# Patient Record
Sex: FEMALE | Employment: FULL TIME | URBAN - METROPOLITAN AREA
[De-identification: names, ages, dates, MRNs, and addresses within clinical notes are randomized per-mention and may not be internally consistent; named-entity substitution may affect disease eponyms.]

---

## 2017-09-01 ENCOUNTER — OFFICE VISIT (OUTPATIENT)
Dept: OBGYN CLINIC | Facility: CLINIC | Age: 47
End: 2017-09-01

## 2017-09-01 VITALS
DIASTOLIC BLOOD PRESSURE: 66 MMHG | WEIGHT: 124 LBS | HEIGHT: 61 IN | SYSTOLIC BLOOD PRESSURE: 104 MMHG | BODY MASS INDEX: 23.41 KG/M2

## 2017-09-01 DIAGNOSIS — Z01.419 WOMEN'S ANNUAL ROUTINE GYNECOLOGICAL EXAMINATION: Primary | ICD-10-CM

## 2017-09-01 PROBLEM — B00.9 HERPES: Status: ACTIVE | Noted: 2017-09-01

## 2017-09-01 PROCEDURE — 99396 PREV VISIT EST AGE 40-64: CPT | Performed by: OBSTETRICS & GYNECOLOGY

## 2017-09-01 PROCEDURE — 88175 CYTOPATH C/V AUTO FLUID REDO: CPT | Performed by: OBSTETRICS & GYNECOLOGY

## 2017-09-01 PROCEDURE — 87624 HPV HI-RISK TYP POOLED RSLT: CPT | Performed by: OBSTETRICS & GYNECOLOGY

## 2017-09-01 RX ORDER — VITAMIN B COMPLEX
1 TABLET ORAL
COMMUNITY
End: 2020-12-16

## 2017-09-01 NOTE — PROGRESS NOTES
Judith Trevino here for gynecologic checkup. She has no gynecologic complaints. Patient's periods are about 26-28 days apart lasting for 7 days. She says that about a day or 2 of her periods sometimes are very heavy.   She denies shortness of breath, fainting s Topics   Smoking status: Never Smoker    Smokeless tobacco: Never Used    Alcohol use Yes  0.0 oz/week     Drug use: No    Sexual activity: Yes    Partners: Male     Other Topics Concern   None on file     Social History Narrative   None on file       Exam

## 2017-09-04 LAB — HPV I/H RISK 1 DNA SPEC QL NAA+PROBE: NEGATIVE

## 2018-08-30 ENCOUNTER — OFFICE VISIT (OUTPATIENT)
Dept: OBGYN CLINIC | Facility: CLINIC | Age: 48
End: 2018-08-30
Payer: COMMERCIAL

## 2018-08-30 VITALS
HEIGHT: 61.5 IN | DIASTOLIC BLOOD PRESSURE: 74 MMHG | BODY MASS INDEX: 24.23 KG/M2 | WEIGHT: 130 LBS | SYSTOLIC BLOOD PRESSURE: 116 MMHG

## 2018-08-30 DIAGNOSIS — Z01.419 WOMEN'S ANNUAL ROUTINE GYNECOLOGICAL EXAMINATION: Primary | ICD-10-CM

## 2018-08-30 PROBLEM — D24.9 FIBROADENOMA OF BREAST: Status: ACTIVE | Noted: 2018-08-30

## 2018-08-30 PROCEDURE — 99396 PREV VISIT EST AGE 40-64: CPT | Performed by: OBSTETRICS & GYNECOLOGY

## 2018-08-30 PROCEDURE — 87624 HPV HI-RISK TYP POOLED RSLT: CPT | Performed by: OBSTETRICS & GYNECOLOGY

## 2018-08-30 RX ORDER — CHLORAL HYDRATE 500 MG
CAPSULE ORAL
COMMUNITY

## 2018-08-30 NOTE — PROGRESS NOTES
Hoda Weinstein is here for a checkup. She says that she has appear to be 26-28 days and about a day before her period starts she gets pelvic congestion type of symptoms at night and when she walks around for a while the symptoms go away.   She says it is not very Take by mouth.  Disp:  Rfl:        Family History     Family History   Problem Relation Age of Onset   • Kidney Disease Father    • Hypertension Father        Social History       Social History  Social History   Marital status: Single  Spouse name: N/A [facility] cervical os.       Nuno Rodriguez MD  8:23 AM  8/30/2018

## 2018-08-31 LAB — HPV I/H RISK 1 DNA SPEC QL NAA+PROBE: NEGATIVE

## 2019-03-29 ENCOUNTER — OFFICE VISIT (OUTPATIENT)
Dept: OBGYN CLINIC | Facility: CLINIC | Age: 49
End: 2019-03-29
Payer: COMMERCIAL

## 2019-03-29 VITALS — SYSTOLIC BLOOD PRESSURE: 102 MMHG | BODY MASS INDEX: 24 KG/M2 | HEIGHT: 61.5 IN | DIASTOLIC BLOOD PRESSURE: 60 MMHG

## 2019-03-29 DIAGNOSIS — R87.610 ATYPICAL SQUAMOUS CELL CHANGES OF UNDETERMINED SIGNIFICANCE (ASCUS) ON CERVICAL CYTOLOGY WITH NEGATIVE HIGH RISK HUMAN PAPILLOMA VIRUS (HPV) TEST RESULT: Primary | ICD-10-CM

## 2019-03-29 PROCEDURE — 99213 OFFICE O/P EST LOW 20 MIN: CPT | Performed by: OBSTETRICS & GYNECOLOGY

## 2019-03-29 PROCEDURE — 87624 HPV HI-RISK TYP POOLED RSLT: CPT | Performed by: OBSTETRICS & GYNECOLOGY

## 2019-03-29 NOTE — PROGRESS NOTES
Eli Kirk is here for repeat Pap smear. Her previous Pap smear had shown ASCUS with negative HPV. On examination today: Cervix appears normal obtained a Pap smear on her patient is towards the tail end of her period.   She is 48 and it is possible Pap sme

## 2019-03-31 LAB — HPV I/H RISK 1 DNA SPEC QL NAA+PROBE: NEGATIVE

## 2019-06-05 ENCOUNTER — TELEPHONE (OUTPATIENT)
Dept: OBGYN CLINIC | Facility: CLINIC | Age: 49
End: 2019-06-05

## 2019-06-05 NOTE — TELEPHONE ENCOUNTER
Pt states she's have vaginal irritation on the outside with no discharge. Pt wants to come in asap I explained to pt no openings. Pt wants a call back.

## 2019-06-05 NOTE — TELEPHONE ENCOUNTER
Spoke with pt who has had vag irritation intermittently since 5/30/19. Pt denies: fever, chills, vag discharge. Pt unsure of cause. Advised pt to go to urgent care r/t symptoms since no openings this week.  [facility]; pt will ca

## 2019-06-11 ENCOUNTER — OFFICE VISIT (OUTPATIENT)
Dept: OBGYN CLINIC | Facility: CLINIC | Age: 49
End: 2019-06-11
Payer: COMMERCIAL

## 2019-06-11 VITALS — HEIGHT: 61.5 IN | SYSTOLIC BLOOD PRESSURE: 122 MMHG | DIASTOLIC BLOOD PRESSURE: 76 MMHG | BODY MASS INDEX: 24 KG/M2

## 2019-06-11 DIAGNOSIS — N76.0 VAGINITIS AND VULVOVAGINITIS: Primary | ICD-10-CM

## 2019-06-11 PROBLEM — N76.2 VULVITIS: Status: ACTIVE | Noted: 2019-06-11

## 2019-06-11 PROCEDURE — 87205 SMEAR GRAM STAIN: CPT | Performed by: OBSTETRICS & GYNECOLOGY

## 2019-06-11 PROCEDURE — 87808 TRICHOMONAS ASSAY W/OPTIC: CPT | Performed by: OBSTETRICS & GYNECOLOGY

## 2019-06-11 PROCEDURE — 87106 FUNGI IDENTIFICATION YEAST: CPT | Performed by: OBSTETRICS & GYNECOLOGY

## 2019-06-11 PROCEDURE — 99213 OFFICE O/P EST LOW 20 MIN: CPT | Performed by: OBSTETRICS & GYNECOLOGY

## 2019-06-11 RX ORDER — CLOBETASOL PROPIONATE 0.5 MG/G
1 CREAM TOPICAL 2 TIMES DAILY
Qty: 1 TUBE | Refills: 0 | Status: SHIPPED | OUTPATIENT
Start: 2019-06-11 | End: 2019-06-18

## 2019-06-11 NOTE — PROGRESS NOTES
Clare Abram here for a checkup. Patient says that she was on a vacation in [facility] and started having vulvar itch around May 27 and she used some over-the-counter remedies and is not helping. Itch is external.    Patient is currently on her menstrual cycle.

## 2019-12-30 ENCOUNTER — OFFICE VISIT (OUTPATIENT)
Dept: OBGYN CLINIC | Facility: CLINIC | Age: 49
End: 2019-12-30
Payer: COMMERCIAL

## 2019-12-30 VITALS — DIASTOLIC BLOOD PRESSURE: 78 MMHG | SYSTOLIC BLOOD PRESSURE: 104 MMHG | HEIGHT: 61.5 IN | BODY MASS INDEX: 24 KG/M2

## 2019-12-30 DIAGNOSIS — R87.610 ASCUS OF CERVIX WITH NEGATIVE HIGH RISK HPV: Primary | ICD-10-CM

## 2019-12-30 PROBLEM — G89.29 CHRONIC PAIN OF RIGHT KNEE: Status: ACTIVE | Noted: 2019-07-10

## 2019-12-30 PROBLEM — M25.561 CHRONIC PAIN OF RIGHT KNEE: Status: ACTIVE | Noted: 2019-07-10

## 2019-12-30 PROBLEM — N63.0 BREAST MASS: Status: ACTIVE | Noted: 2018-04-20

## 2019-12-30 PROCEDURE — 99213 OFFICE O/P EST LOW 20 MIN: CPT | Performed by: OBSTETRICS & GYNECOLOGY

## 2019-12-30 PROCEDURE — 87624 HPV HI-RISK TYP POOLED RSLT: CPT | Performed by: OBSTETRICS & GYNECOLOGY

## 2019-12-30 NOTE — PROGRESS NOTES
Jacinta Croft is here for follow-up Pap smear. Patient says that recently she has noticed that her cycles are about 24 to 26 days apart lasting for 5 days.   Following her menstrual cycle she gets clear discharge for about 2 weeks and has vaginal spotting for 24 Assessment     No diagnosis found.      ASSESSMENT:      Normal gynecologic exam, patient has minimal spotting and it is possible her Pap smear may show endometrial cells    PLAN:     If this Pap smear is normal I would like to see her back again in 1 yea

## 2020-02-27 ENCOUNTER — PATIENT MESSAGE (OUTPATIENT)
Dept: OBGYN CLINIC | Facility: CLINIC | Age: 50
End: 2020-02-27

## 2020-02-27 RX ORDER — CLOBETASOL PROPIONATE 0.5 MG/G
1 CREAM TOPICAL 2 TIMES DAILY
Qty: 1 TUBE | Refills: 1 | Status: SHIPPED | OUTPATIENT
Start: 2020-02-27 | End: 2020-03-05

## 2020-02-27 NOTE — TELEPHONE ENCOUNTER
From: Sol Sow  To: Jw Jean MD  Sent: 2/27/2020 7:09 AM CST  Subject: Prescription Question    Hello, I noticed that the cream I was using expires February 2020.  I was trying to get it re-filled but could not find it when i choose [facility]

## 2020-12-16 ENCOUNTER — OFFICE VISIT (OUTPATIENT)
Dept: OBGYN CLINIC | Facility: CLINIC | Age: 50
End: 2020-12-16
Payer: COMMERCIAL

## 2020-12-16 VITALS
HEIGHT: 61.5 IN | WEIGHT: 132 LBS | DIASTOLIC BLOOD PRESSURE: 84 MMHG | BODY MASS INDEX: 24.6 KG/M2 | SYSTOLIC BLOOD PRESSURE: 122 MMHG

## 2020-12-16 DIAGNOSIS — Z01.419 WOMEN'S ANNUAL ROUTINE GYNECOLOGICAL EXAMINATION: Primary | ICD-10-CM

## 2020-12-16 DIAGNOSIS — R92.2 BREAST DENSITY: ICD-10-CM

## 2020-12-16 PROBLEM — E03.8 SUBCLINICAL HYPOTHYROIDISM: Status: ACTIVE | Noted: 2020-08-27

## 2020-12-16 PROBLEM — N92.6 IRREGULAR MENSTRUAL CYCLE: Status: ACTIVE | Noted: 2020-02-21

## 2020-12-16 PROCEDURE — 3008F BODY MASS INDEX DOCD: CPT | Performed by: OBSTETRICS & GYNECOLOGY

## 2020-12-16 PROCEDURE — 3074F SYST BP LT 130 MM HG: CPT | Performed by: OBSTETRICS & GYNECOLOGY

## 2020-12-16 PROCEDURE — 87624 HPV HI-RISK TYP POOLED RSLT: CPT | Performed by: OBSTETRICS & GYNECOLOGY

## 2020-12-16 PROCEDURE — 3079F DIAST BP 80-89 MM HG: CPT | Performed by: OBSTETRICS & GYNECOLOGY

## 2020-12-16 PROCEDURE — 99396 PREV VISIT EST AGE 40-64: CPT | Performed by: OBSTETRICS & GYNECOLOGY

## 2020-12-16 RX ORDER — OLOPATADINE HYDROCHLORIDE 1 MG/ML
1 SOLUTION/ DROPS OPHTHALMIC 2 TIMES DAILY
COMMUNITY
Start: 2020-04-09

## 2020-12-16 RX ORDER — MOMETASONE FUROATE 1 MG/G
CREAM TOPICAL
COMMUNITY
Start: 2020-11-17

## 2020-12-16 NOTE — PROGRESS NOTES
[facility] is here for a checkup. Patient recently had mammogram and have requested diagnostic right breast mammogram.  Mammogram was performed at [facility].  I gave her the requisition for that.     Patient had colonoscopy this year for colon po times daily. • Olopatadine HCl 0.1 % Ophthalmic Solution Inject 1 drop into the eye 2 (two) times daily. • Mometasone Furoate 0.1 % External Cream      • omega-3 fatty acids 1000 MG Oral Cap      • VITAMIN D, ERGOCALCIFEROL, OR Take by mouth. Wt 132 lb (59.9 kg)   LMP 11/10/2020 (Exact Date)   BMI 24.54 kg/m²     GENERAL: well developed, well nourished, in no apparent distress  SKIN: no rashes, no suspicious lesions  HEENT: normal  NECK: supple; no thyroidmegaly, no adenopathy  LUNGS: clear t

## 2021-06-04 ENCOUNTER — PATIENT MESSAGE (OUTPATIENT)
Dept: OBGYN CLINIC | Facility: CLINIC | Age: 51
End: 2021-06-04

## 2021-06-04 NOTE — TELEPHONE ENCOUNTER
From: Lalita Sow  To: Sidney Stewart MD  Sent: 6/4/2021 6:41 AM CDT  Subject: Other    Hello,  I was notified that Dr. Carmelita Abad is retiring at the end of June. I wanted to drop off a small token to wish him well.  When is it possible to visit the offic

## 2021-08-02 ENCOUNTER — TELEPHONE (OUTPATIENT)
Dept: OBGYN CLINIC | Facility: CLINIC | Age: 51
End: 2021-08-02

## 2021-08-02 NOTE — TELEPHONE ENCOUNTER
Patient calling to let our office know that she would like to continue GYN care with Dr. Edd Marshall.

## 2021-10-08 ENCOUNTER — LAB ENCOUNTER (OUTPATIENT)
Dept: LAB | Facility: REFERENCE LAB | Age: 51
End: 2021-10-08
Attending: OBSTETRICS & GYNECOLOGY
Payer: COMMERCIAL

## 2021-10-08 ENCOUNTER — OFFICE VISIT (OUTPATIENT)
Dept: OBGYN CLINIC | Facility: CLINIC | Age: 51
End: 2021-10-08
Payer: COMMERCIAL

## 2021-10-08 VITALS
HEIGHT: 61.5 IN | SYSTOLIC BLOOD PRESSURE: 120 MMHG | DIASTOLIC BLOOD PRESSURE: 62 MMHG | WEIGHT: 133.31 LBS | BODY MASS INDEX: 24.85 KG/M2

## 2021-10-08 DIAGNOSIS — N92.6 IRREGULAR MENSTRUAL CYCLE: Primary | ICD-10-CM

## 2021-10-08 DIAGNOSIS — N92.6 IRREGULAR MENSTRUAL CYCLE: ICD-10-CM

## 2021-10-08 PROBLEM — N95.1 PERIMENOPAUSAL SYMPTOMS: Status: ACTIVE | Noted: 2021-10-08

## 2021-10-08 PROCEDURE — 84144 ASSAY OF PROGESTERONE: CPT

## 2021-10-08 PROCEDURE — 3074F SYST BP LT 130 MM HG: CPT | Performed by: OBSTETRICS & GYNECOLOGY

## 2021-10-08 PROCEDURE — 82670 ASSAY OF TOTAL ESTRADIOL: CPT

## 2021-10-08 PROCEDURE — 84402 ASSAY OF FREE TESTOSTERONE: CPT

## 2021-10-08 PROCEDURE — 99214 OFFICE O/P EST MOD 30 MIN: CPT | Performed by: OBSTETRICS & GYNECOLOGY

## 2021-10-08 PROCEDURE — 84403 ASSAY OF TOTAL TESTOSTERONE: CPT

## 2021-10-08 PROCEDURE — 84443 ASSAY THYROID STIM HORMONE: CPT

## 2021-10-08 PROCEDURE — 3008F BODY MASS INDEX DOCD: CPT | Performed by: OBSTETRICS & GYNECOLOGY

## 2021-10-08 PROCEDURE — 36415 COLL VENOUS BLD VENIPUNCTURE: CPT

## 2021-10-08 PROCEDURE — 3078F DIAST BP <80 MM HG: CPT | Performed by: OBSTETRICS & GYNECOLOGY

## 2021-10-08 PROCEDURE — 83002 ASSAY OF GONADOTROPIN (LH): CPT

## 2021-10-08 PROCEDURE — 83001 ASSAY OF GONADOTROPIN (FSH): CPT

## 2021-10-08 RX ORDER — CLOBETASOL PROPIONATE 0.5 MG/G
CREAM TOPICAL 2 TIMES DAILY
COMMUNITY

## 2021-10-08 NOTE — PROGRESS NOTES
Emmie Pollard is a 46year old female. HPI:   Patient presents with:  Establish [facility] of [facility]. Menopause: questions about menopause   Irregular Periods: pt c/o irregular periods.  LMP 07/12/21 prev periods 02/14/21  Hot Flashes: pt c/o hot flas TESTOSTERONE,FREE AND TOTAL; Future  - ASSAY, THYROID STIM HORMONE; Future  - ESTRADIOL; Future  - PROGESTERONE; Future  - Track and log symptoms to correlate with triggers  - Lifestyle modifications recommendations reviewed    2.  Irregular menstrual cycle

## 2021-12-09 ENCOUNTER — OFFICE VISIT (OUTPATIENT)
Dept: OBGYN CLINIC | Facility: CLINIC | Age: 51
End: 2021-12-09
Payer: COMMERCIAL

## 2021-12-09 VITALS
SYSTOLIC BLOOD PRESSURE: 126 MMHG | WEIGHT: 135.63 LBS | BODY MASS INDEX: 25.28 KG/M2 | DIASTOLIC BLOOD PRESSURE: 70 MMHG | HEIGHT: 61.5 IN

## 2021-12-09 DIAGNOSIS — Z01.419 WOMEN'S ANNUAL ROUTINE GYNECOLOGICAL EXAMINATION: ICD-10-CM

## 2021-12-09 DIAGNOSIS — N92.6 IRREGULAR MENSTRUAL CYCLE: Primary | ICD-10-CM

## 2021-12-09 DIAGNOSIS — Z12.4 ROUTINE CERVICAL SMEAR: ICD-10-CM

## 2021-12-09 DIAGNOSIS — N89.8 VAGINAL DISCHARGE: ICD-10-CM

## 2021-12-09 PROCEDURE — 99396 PREV VISIT EST AGE 40-64: CPT | Performed by: OBSTETRICS & GYNECOLOGY

## 2021-12-09 PROCEDURE — 3074F SYST BP LT 130 MM HG: CPT | Performed by: OBSTETRICS & GYNECOLOGY

## 2021-12-09 PROCEDURE — 3008F BODY MASS INDEX DOCD: CPT | Performed by: OBSTETRICS & GYNECOLOGY

## 2021-12-09 PROCEDURE — 87624 HPV HI-RISK TYP POOLED RSLT: CPT | Performed by: OBSTETRICS & GYNECOLOGY

## 2021-12-09 PROCEDURE — 3078F DIAST BP <80 MM HG: CPT | Performed by: OBSTETRICS & GYNECOLOGY

## 2021-12-09 NOTE — PROGRESS NOTES
GYN ANNUAL    12/9/2021  3:29 PM    Patient presents with: Annual: pt here for annual exam   Vaginal Problem: pt c/o clear vaginal discharge   Abdominal Pain: pt reports periods are irregular periods lmp 08/2021 then light spotting 11/2021  .     HPI: Geovani Rasheed 2002/2007    BREAST LUMP REMOVED-BENIGN      No Known Allergies  Family History   Problem Relation Age of Onset   • Kidney Disease Father    • Hypertension Father      Social History    Socioeconomic History      Marital status: Single    Occupational Hist Soha Bran MD

## 2023-01-10 ENCOUNTER — LAB ENCOUNTER (OUTPATIENT)
Dept: LAB | Facility: REFERENCE LAB | Age: 53
End: 2023-01-10
Attending: OBSTETRICS & GYNECOLOGY
Payer: COMMERCIAL

## 2023-01-10 ENCOUNTER — OFFICE VISIT (OUTPATIENT)
Dept: OBGYN CLINIC | Facility: CLINIC | Age: 53
End: 2023-01-10
Payer: COMMERCIAL

## 2023-01-10 VITALS
BODY MASS INDEX: 25.2 KG/M2 | SYSTOLIC BLOOD PRESSURE: 108 MMHG | HEIGHT: 61.5 IN | WEIGHT: 135.19 LBS | DIASTOLIC BLOOD PRESSURE: 70 MMHG

## 2023-01-10 DIAGNOSIS — N92.6 IRREGULAR MENSTRUAL CYCLE: ICD-10-CM

## 2023-01-10 DIAGNOSIS — Z01.419 WOMEN'S ANNUAL ROUTINE GYNECOLOGICAL EXAMINATION: ICD-10-CM

## 2023-01-10 DIAGNOSIS — Z12.31 BREAST CANCER SCREENING BY MAMMOGRAM: Primary | ICD-10-CM

## 2023-01-10 LAB — FSH SERPL-ACNC: 94.5 MIU/ML

## 2023-01-10 PROCEDURE — 83001 ASSAY OF GONADOTROPIN (FSH): CPT

## 2023-01-10 PROCEDURE — 36415 COLL VENOUS BLD VENIPUNCTURE: CPT

## 2023-01-10 PROCEDURE — 99396 PREV VISIT EST AGE 40-64: CPT | Performed by: OBSTETRICS & GYNECOLOGY

## 2023-01-10 PROCEDURE — 3078F DIAST BP <80 MM HG: CPT | Performed by: OBSTETRICS & GYNECOLOGY

## 2023-01-10 PROCEDURE — 3074F SYST BP LT 130 MM HG: CPT | Performed by: OBSTETRICS & GYNECOLOGY

## 2023-01-10 PROCEDURE — 3008F BODY MASS INDEX DOCD: CPT | Performed by: OBSTETRICS & GYNECOLOGY

## 2023-05-31 ENCOUNTER — OFFICE VISIT (OUTPATIENT)
Dept: OBGYN CLINIC | Facility: CLINIC | Age: 53
End: 2023-05-31
Payer: COMMERCIAL

## 2023-05-31 VITALS
SYSTOLIC BLOOD PRESSURE: 108 MMHG | WEIGHT: 136 LBS | DIASTOLIC BLOOD PRESSURE: 70 MMHG | BODY MASS INDEX: 25.35 KG/M2 | HEIGHT: 61.5 IN

## 2023-05-31 DIAGNOSIS — N89.8 VAGINAL DISCHARGE: Primary | ICD-10-CM

## 2023-05-31 PROCEDURE — 87808 TRICHOMONAS ASSAY W/OPTIC: CPT | Performed by: OBSTETRICS & GYNECOLOGY

## 2023-05-31 PROCEDURE — 3078F DIAST BP <80 MM HG: CPT | Performed by: OBSTETRICS & GYNECOLOGY

## 2023-05-31 PROCEDURE — 87106 FUNGI IDENTIFICATION YEAST: CPT | Performed by: OBSTETRICS & GYNECOLOGY

## 2023-05-31 PROCEDURE — 99213 OFFICE O/P EST LOW 20 MIN: CPT | Performed by: OBSTETRICS & GYNECOLOGY

## 2023-05-31 PROCEDURE — 3008F BODY MASS INDEX DOCD: CPT | Performed by: OBSTETRICS & GYNECOLOGY

## 2023-05-31 PROCEDURE — 3074F SYST BP LT 130 MM HG: CPT | Performed by: OBSTETRICS & GYNECOLOGY

## 2023-05-31 PROCEDURE — 87205 SMEAR GRAM STAIN: CPT | Performed by: OBSTETRICS & GYNECOLOGY

## 2023-05-31 RX ORDER — POLYETHYLENE GLYCOL-3350 AND ELECTROLYTES WITH FLAVOR PACK 240; 5.84; 2.98; 6.72; 22.72 G/278.26G; G/278.26G; G/278.26G; G/278.26G; G/278.26G
POWDER, FOR SOLUTION ORAL
COMMUNITY
Start: 2023-05-04

## 2023-05-31 RX ORDER — PREDNISONE 5 MG/1
TABLET ORAL
COMMUNITY
Start: 2023-03-31

## 2023-05-31 RX ORDER — PERPHENAZINE 4 MG
TABLET ORAL
COMMUNITY
Start: 2023-01-02

## 2023-06-02 ENCOUNTER — TELEPHONE (OUTPATIENT)
Dept: OBGYN CLINIC | Facility: CLINIC | Age: 53
End: 2023-06-02

## 2023-06-02 LAB
GENITAL VAGINOSIS SCREEN: NEGATIVE
TRICHOMONAS SCREEN: NEGATIVE

## 2023-06-03 ENCOUNTER — PATIENT MESSAGE (OUTPATIENT)
Dept: OBGYN CLINIC | Facility: CLINIC | Age: 53
End: 2023-06-03

## 2023-06-05 NOTE — PROGRESS NOTES
Released to [employer] and message from provider/results was viewed by patient.    Seen by patient [facility] on 6/3/2023  8:55 AM

## 2023-06-05 NOTE — TELEPHONE ENCOUNTER
From: Nick Sow  To: Sher Ferrara MD  Sent: 6/3/2023 8:58 AM CDT  Subject: Test Results    Hi Dr. Stanley Serna, I read the test results are negative. What do you recommend at this point? Should I use boric acid to \"reset\"? Or is this just a case of removing clothes after working out/drying off? Thank you.   Anay Waldron

## 2023-09-21 ENCOUNTER — TELEPHONE (OUTPATIENT)
Dept: OBGYN CLINIC | Facility: CLINIC | Age: 53
End: 2023-09-21

## 2023-09-26 ENCOUNTER — HOSPITAL ENCOUNTER (OUTPATIENT)
Dept: ULTRASOUND IMAGING | Facility: HOSPITAL | Age: 53
Discharge: HOME | End: 2023-09-26
Attending: OBSTETRICS & GYNECOLOGY
Payer: COMMERCIAL

## 2023-09-26 ENCOUNTER — OFFICE VISIT (OUTPATIENT)
Dept: OBGYN CLINIC | Facility: CLINIC | Age: 53
End: 2023-09-26
Payer: COMMERCIAL

## 2023-09-26 VITALS
HEIGHT: 61.5 IN | DIASTOLIC BLOOD PRESSURE: 60 MMHG | SYSTOLIC BLOOD PRESSURE: 112 MMHG | BODY MASS INDEX: 25.35 KG/M2 | WEIGHT: 136 LBS

## 2023-09-26 DIAGNOSIS — N95.0 POSTMENOPAUSAL BLEEDING: ICD-10-CM

## 2023-09-26 DIAGNOSIS — N95.0 POSTMENOPAUSAL BLEEDING: Primary | ICD-10-CM

## 2023-09-26 PROCEDURE — 76830 TRANSVAGINAL US NON-OB: CPT | Performed by: OBSTETRICS & GYNECOLOGY

## 2023-09-26 PROCEDURE — 3074F SYST BP LT 130 MM HG: CPT | Performed by: OBSTETRICS & GYNECOLOGY

## 2023-09-26 PROCEDURE — 3008F BODY MASS INDEX DOCD: CPT | Performed by: OBSTETRICS & GYNECOLOGY

## 2023-09-26 PROCEDURE — 76856 US EXAM PELVIC COMPLETE: CPT | Performed by: OBSTETRICS & GYNECOLOGY

## 2023-09-26 PROCEDURE — 3078F DIAST BP <80 MM HG: CPT | Performed by: OBSTETRICS & GYNECOLOGY

## 2023-09-26 PROCEDURE — 99213 OFFICE O/P EST LOW 20 MIN: CPT | Performed by: OBSTETRICS & GYNECOLOGY

## 2023-09-26 RX ORDER — MISOPROSTOL 200 UG/1
200 TABLET ORAL
Qty: 1 TABLET | Refills: 0 | Status: SHIPPED | OUTPATIENT
Start: 2023-09-26 | End: 2023-09-27

## 2023-09-28 NOTE — PROGRESS NOTES
Released to [employer] and message from provider/results was viewed by patient. Next Appt:  With Obstetrics/Gynecology Darlene Gusman MD)  10/04/2023 at 4:15 PM

## 2023-10-04 ENCOUNTER — OFFICE VISIT (OUTPATIENT)
Dept: OBGYN CLINIC | Facility: CLINIC | Age: 53
End: 2023-10-04
Payer: COMMERCIAL

## 2023-10-04 DIAGNOSIS — N95.0 POSTMENOPAUSAL BLEEDING: Primary | ICD-10-CM

## 2023-10-04 DIAGNOSIS — N83.202 LEFT OVARIAN CYST: ICD-10-CM

## 2023-10-04 DIAGNOSIS — R93.89 ENDOMETRIAL THICKENING ON ULTRASOUND: ICD-10-CM

## 2023-10-04 PROCEDURE — 88305 TISSUE EXAM BY PATHOLOGIST: CPT | Performed by: OBSTETRICS & GYNECOLOGY

## 2023-10-04 NOTE — PROCEDURES
PROCEDURE NOTE           ENDOMETRIAL BIOPSY     OBSTETRICS & GYNECOLOGY        [facility]    Indication:   1) PMB  2) Thickened EM    Procedure explained. Risks and benefits reviewed. Consent obtained. Sterile speculum placed. Betadine wash x 3 performed. Single tooth tenaculum applied to anterior lip of cervix. Uterus sounded to 8 cm then EM pipelle introduced into uterine cavity without difficulty and small amount of tissue obtained. All instruments removed from cervix and vagina. No complications. Patient tolerated procedure well.     Plan: Biopsy to Pathology      Sameer Claudio MD  10/4/23  5:01 PM

## 2023-10-10 ENCOUNTER — OFFICE VISIT (OUTPATIENT)
Dept: OBGYN CLINIC | Facility: CLINIC | Age: 53
End: 2023-10-10
Payer: COMMERCIAL

## 2023-10-10 DIAGNOSIS — R93.89 ENDOMETRIAL THICKENING ON ULTRASOUND: ICD-10-CM

## 2023-10-10 DIAGNOSIS — N83.202 LEFT OVARIAN CYST: ICD-10-CM

## 2023-10-10 DIAGNOSIS — N95.0 POSTMENOPAUSAL BLEEDING: Primary | ICD-10-CM

## 2023-10-10 PROCEDURE — 99213 OFFICE O/P EST LOW 20 MIN: CPT | Performed by: OBSTETRICS & GYNECOLOGY

## 2023-10-10 NOTE — PROGRESS NOTES
Fredo Carrasquillo is a 48year old female. HPI:   Patient presents with: Follow - Up: Pt here to discuss nest steps after emb done 10/04/23    EMB results reviewed = scant proliferative benign EM (1.8 cm aggregate tissue). Discussed continuing to monitor for any further bleeding and scheduling [facility] D&C if recurs. Also discussed scheduling 6 week surveillance USN for left ovarian cyst. Questions answered and agrees with plan. Medications (Active prior to today's visit):  Current Outpatient Medications   Medication Sig Dispense Refill    Collagen-Vitamin C-Biotin (COLLAGEN 1500/C) 500-50-0.8 MG Oral Cap       GAVILYTE-C 240 g Oral Recon Soln       predniSONE 5 MG Oral Tab       VITAMIN B COMPLEX-C OR       omega-3 fatty acids 1000 MG Oral Cap       VITAMIN D, ERGOCALCIFEROL, OR Take by mouth. Allergies:  No Known Allergies    LMP 03/01/2022 (Exact Date)        ASSESSMENT/PLAN:   Assessment       1. Postmenopausal bleeding  - Normal EMB    2. Endometrial thickening on ultrasound  - Call for [facility] if any further bleeding occurs    3.  Left ovarian cyst  - Repeat USN ordered      Total time spent = 15 minutes  >50% of visit = face to face discussion and coordination of care        10/10/2023  Molly Nation MD

## 2023-11-03 ENCOUNTER — TELEPHONE (OUTPATIENT)
Dept: OBGYN CLINIC | Facility: CLINIC | Age: 53
End: 2023-11-03

## 2023-12-11 ENCOUNTER — TELEPHONE (OUTPATIENT)
Dept: OBGYN CLINIC | Facility: CLINIC | Age: 53
End: 2023-12-11

## 2023-12-11 NOTE — TELEPHONE ENCOUNTER
Jerri Arriola MD  You38 minutes ago (10:49 AM)     Yes please. EB     You  Jerri Arriola MD40 minutes ago (10:47 AM)     AD  Do you want me to schedule her for a follow-up? Called pt and scheduled for tomorrow. Pt agrees. Last visit 10/10/2023 DILIP      ASSESSMENT/PLAN:      Assessment  1. Postmenopausal bleeding  - Normal EMB     2. Endometrial thickening on ultrasound  - Call for [facility] if any further bleeding occurs     3.  Left ovarian cyst  - Repeat USN ordered        Total time spent = 15 minutes  >50% of visit = face to face discussion and coordination of care

## 2023-12-11 NOTE — TELEPHONE ENCOUNTER
Patient was told if she started bleeding again prior to the ultrasound scheduled on 12/14 to let Dr Vilma Bryant know. Patient started bleeding on 12/9.

## 2023-12-12 ENCOUNTER — OFFICE VISIT (OUTPATIENT)
Dept: OBGYN CLINIC | Facility: CLINIC | Age: 53
End: 2023-12-12
Payer: COMMERCIAL

## 2023-12-12 VITALS
BODY MASS INDEX: 25.35 KG/M2 | SYSTOLIC BLOOD PRESSURE: 104 MMHG | WEIGHT: 136 LBS | HEIGHT: 61.5 IN | DIASTOLIC BLOOD PRESSURE: 62 MMHG

## 2023-12-12 DIAGNOSIS — N95.0 POSTMENOPAUSAL BLEEDING: ICD-10-CM

## 2023-12-12 DIAGNOSIS — R93.89 ENDOMETRIAL THICKENING ON ULTRASOUND: ICD-10-CM

## 2023-12-12 DIAGNOSIS — Z12.31 BREAST CANCER SCREENING BY MAMMOGRAM: Primary | ICD-10-CM

## 2023-12-12 PROCEDURE — 99213 OFFICE O/P EST LOW 20 MIN: CPT | Performed by: OBSTETRICS & GYNECOLOGY

## 2023-12-12 PROCEDURE — 3074F SYST BP LT 130 MM HG: CPT | Performed by: OBSTETRICS & GYNECOLOGY

## 2023-12-12 PROCEDURE — 3078F DIAST BP <80 MM HG: CPT | Performed by: OBSTETRICS & GYNECOLOGY

## 2023-12-12 PROCEDURE — 3008F BODY MASS INDEX DOCD: CPT | Performed by: OBSTETRICS & GYNECOLOGY

## 2023-12-14 ENCOUNTER — ULTRASOUND ENCOUNTER (OUTPATIENT)
Dept: OBGYN CLINIC | Facility: CLINIC | Age: 53
End: 2023-12-14
Payer: COMMERCIAL

## 2023-12-14 DIAGNOSIS — N83.202 LEFT OVARIAN CYST: ICD-10-CM

## 2024-01-15 ENCOUNTER — OFFICE VISIT (OUTPATIENT)
Dept: OBGYN CLINIC | Facility: CLINIC | Age: 54
End: 2024-01-15
Payer: COMMERCIAL

## 2024-01-15 ENCOUNTER — TELEPHONE (OUTPATIENT)
Dept: OBGYN CLINIC | Facility: CLINIC | Age: 54
End: 2024-01-15

## 2024-01-15 VITALS
SYSTOLIC BLOOD PRESSURE: 132 MMHG | WEIGHT: 136 LBS | BODY MASS INDEX: 25.35 KG/M2 | HEIGHT: 61.5 IN | DIASTOLIC BLOOD PRESSURE: 84 MMHG

## 2024-01-15 DIAGNOSIS — N95.0 POSTMENOPAUSAL BLEEDING: Primary | ICD-10-CM

## 2024-01-15 DIAGNOSIS — R93.89 THICKENED ENDOMETRIUM: ICD-10-CM

## 2024-01-15 DIAGNOSIS — R93.89 ENDOMETRIAL THICKENING ON ULTRASOUND: ICD-10-CM

## 2024-01-15 NOTE — PROGRESS NOTES
New Patient GYN History and Physical  [facility] OB/GYN    CHIEF COMPLAINT:    Chief Complaint   Patient presents with    Consult    Vaginal Bleeding     Pt states vaginal bleeding started again 24      HISTORY OF PRESENT ILLNESS:   Alana Sow is a 53 year old female  who presents for surgical consult - has been seeing Dr. Hall for postmenopausal bleeding.    Last pnormal period >1 year ago     - spotting, as if it would turn into a period  Had US 9/26  10/4 had biopsy - if more bleeding, was to call her.     Last time she sasw bleeding  - light bleeding - not like a period. Lasted 5-6 days.  No other period-type symptoms (ha, sore breasts / backache)    2022 was last normal period.  Each occurrence of bleeding, has been light bleeding for several days.    No bleeding after sex - hasn't had sex since September.        PAST MEDICAL HISTORY:   Past Medical History:   Diagnosis Date    Abnormal Pap smear of cervix     ASCUS    H/O left breast biopsy     Human papilloma virus infection         PAST SURGICAL HISTORY:   Past Surgical History:   Procedure Laterality Date    Breast biopsy Left     Colon surgery  2020    POLYPECTOMY     Other surgical history Bilateral     BREAST LUMP REMOVED-BENIGN         PAST OB HISTORY:  OB History    Para Term  AB Living   0 0 0 0 0 0   SAB IAB Ectopic Multiple Live Births   0 0 0 0         CURRENT MEDICATIONS:      Current Outpatient Medications:     Collagen-Vitamin C-Biotin (COLLAGEN 1500/C) 500-50-0.8 MG Oral Cap, , Disp: , Rfl:     VITAMIN B COMPLEX-C OR, , Disp: , Rfl:     omega-3 fatty acids 1000 MG Oral Cap, , Disp: , Rfl:     VITAMIN D, ERGOCALCIFEROL, OR, Take by mouth., Disp: , Rfl:     ALLERGIES:  No Known Allergies    SOCIAL HISTORY:  Social History     Socioeconomic History    Marital status: Single   Occupational History    Occupation:     Tobacco Use    Smoking status: Never    Smokeless tobacco: Never    Substance and Sexual Activity    Alcohol use: Yes     Alcohol/week: 0.0 standard drinks of alcohol     Comment: socailly    Drug use: No    Sexual activity: Yes     Partners: Male       FAMILY HISTORY:  Family History   Problem Relation Age of Onset    Kidney Disease Father     Hypertension Father      ASSESSMENTS:  PHYSICAL EXAM:   Patient's last menstrual period was 01/08/2024 (exact date).   Vitals:    01/15/24 1327   BP: 132/84   Weight: 136 lb (61.7 kg)   Height: 61.5\"     CONSTITUTIONAL: Awake, alert, cooperative, no apparent distress, and appears stated age   NECK: Supple, symmetrical, trachea midline, no adenopathy, thyroid symmetric, not enlarged and no tenderness  LUNGS: No excess work of breathing  NEUROLOGIC: Patient is awake, alert and oriented to name, place and time. Casual gait is normal.  SKIN: no bruising or bleeding and no rashes  PSYCHIATRIC: Behavior:  Appropriate  Mood:  appropriate    DATA:  US pelvis:   Uterus normal in size and echo-texture.     Endometrium 6.7 mm.     Both ovaries appear normal in size and echo pattern.     Pathology: EMB 10/4/23  Endometrium; biopsy:   Scant fragments of weakly proliferative endometrium with glandular and stromal breakdown.  No definitive evidence of hyperplasia or carcinoma identified.    ASSESSMENT AND PLAN:  1. Postmenopausal bleeding  - discussed risks of malignancy, hyperplasia and need to rule these out.  - EMB benign, but scant cells.  - recommend sampling in light of thickened endometrium >4 mm postmenopausal.  - discussed hysteroscopy D&C, risks including pain, bleeding, infection, uterine perforation with injury to surrounding structures (bowel, bladder, ureters).  - will request to schedule 2/23 hysteroscopy D&C    2. Endometrial thickening on ultrasound  See above discussion    Total face to face time was 30 min, more than 50% of the time was spent in counseling and/or coordination of care related to PMB and surgery.      follow up  or as  needed  Regina Ball, DO

## 2024-01-15 NOTE — TELEPHONE ENCOUNTER
----- Message from Regina Ball DO sent at 1/15/2024  1:50 PM CST -----  Regarding: pls sched surg  Please schedule the following surgery:    Procedure: hysteroscopy D&C  Assist: na  Date: 2/23/24                               Time Requested: 0730  Dx: postmenopausal bleeding, thick endometrium  Pre-op appt: na  Admission type: outpatient  Department of discharge(SDS/Floor): [facility]  Expected length of stay: na  Procedure length time (please enter amount you are requesting): 30 minutes  Recovery time (patients always ask): 1-2 days  Medical Clearance: (Y/N) n  Post- Op f/u appt time frame: 2 weeks post-op    Thank you!  ~RD

## 2024-02-20 NOTE — DISCHARGE INSTRUCTIONS
Post Operative Home Care Instructions     We hope you were pleased with your care at [facility].  We wish you the best outcome and overall experience.  These instructions will help to minimize pain, limit the risk for an infection, and improve the likelihood of a successful recovery.    What to Expect:  Abdominal cramping   Vaginal bleeding for about 1-2 weeks   Constipation is common after surgery. Please keep up with Colace twice per day and Miralax as needed.     Over-The-Counter Medication  Non-prescription anti-inflammatory medications can also help to ease the pain.  You may take Aleve, Tylenol or Ibuprofen   Colace or Metamucil for Constipation  Drink a full glass of water with oral medication and take as directed.    Bathing/Showers  You may resume showers  No baths, swimming, hot tubs for at least 8 weeks.     Home Medication  Resume your home medications as instructed    Diet   Resume your normal diet    Activity  Refrain from vaginal intercourse, vaginal suppositories, tampon use or douches until after your follow up visit   No exercising for 1-2 weeks  You may climb stairs     Return to Work or School  You may return to work in a few days   Contact your gynecologist's office, if you need a medical release. ([phone])    Driving  Avoid driving if taking narcotics    Follow-up Appointment with Your Obstetrician  Call your Gynecologist's office today for a staple removal/incision check appointment and/or follow up appointment.   The number is [phone].  Verify your appointment date, day, time, and location.  At your office visit:  Your progress will be evaluated, pathology will be reviewed, and any additional concerns and instructions will be discussed.    Questions or Concerns  Call your gynecologist's office if you experience the following:  Severe pain not controlled by pain medication  Foul smelling vaginal discharge  Heavy bleeding  Shortness of breath  Fever  Crying and periods  of sadness that prevents you from caring for yourself   Burning sensation during urination or inability to urinate  Swelling, redness or abnormal warmth to your leg/calf  Please call [phone]. If your call is made after office hours, a physician will be available to help you.  There is always a provider covering our patients.    Thank you for coming to [facility] for your surgery.  The nurses, gynecologist, and the anesthesiologists try very hard to make sure you receive the best care possible.  Your trust in them as well as us is greatly appreciated.    Thanks so much,   The Providers of [facility]        [place] INSTRUCTIONS  [facility] [place]: POST-OP ANESTHESIA  The medication that you received for sedation or general anesthesia can last up to 24 hours. Your judgment and reflexes may be altered, even if you feel like your normal self.      We Recommend:   Do not drive any motor vehicle or bicycle   Avoid mowing the lawn, playing sports, or working with power tools/applicances (power saws, electric knives or mixers)   That you have someone stay with you on your first night home   Do not drink alcohol or take sleeping pills or tranquilizers   Do not sign legal documents within 24 hours of your procedure   If you had a nerve block for your surgery, take extra care not to put any pressure on your arm or hand for 24 hours    It is normal:  For you to have a sore throat if you had a breathing tube during surgery (while you were asleep!). The sore throat should get better within 48 hours. You can gargle with warm salt water (1/2 tsp in 4 oz warm water) or use a throat lozenge for comfort  To feel muscle aches or soreness especially in the abdomen, chest or neck. The achy feeling should go away in the next 24 hours  To feel weak, sleepy or \"wiped out\". Your should start feeling better in the next 24 hours.   To experience mild discomforts such as sore lip or tongue,  headache, cramps, gas pains or a bloated feeling in your abdomen.   To experience mild back pain or soreness for a day or two if you had spinal or epidural anesthesia.   If you had laparoscopic surgery, to feel shoulder pain or discomfort on the day of surgery.   For some patients to have nausea after surgery/anesthesia    If you feel nausea or experience vomiting:   Try to move around less.   Eat less than usual or drink only liquids until the next morning   Nausea should resolve in about 24 hours    If you have a problem when you are at home:    Call your surgeons office   Discharge Instructions: After Your Surgery  You’ve just had surgery. During surgery, you were given medicine called anesthesia to keep you relaxed and free of pain. After surgery, you may have some pain or nausea. This is common. Here are some tips for feeling better and getting well after surgery.   Going home  Your healthcare provider will show you how to take care of yourself when you go home. They'll also answer your questions. Have an adult family member or friend drive you home. For the first 24 hours after your surgery:   Don't drive or use heavy equipment.  Don't make important decisions or sign legal papers.  Take medicines as directed.  Don't drink alcohol.  Have someone stay with you, if needed. They can watch for problems and help keep you safe.  Be sure to go to all follow-up visits with your healthcare provider. And rest after your surgery for as long as your provider tells you to.   Coping with pain  If you have pain after surgery, pain medicine will help you feel better. Take it as directed, before pain becomes severe. Also, ask your healthcare provider or pharmacist about other ways to control pain. This might be with heat, ice, or relaxation. And follow any other instructions your surgeon or nurse gives you.      Stay on schedule with your medicine.     Tips for taking pain medicine  To get the best relief possible, remember  these points:   Pain medicines can upset your stomach. Taking them with a little food may help.  Most pain relievers taken by mouth need at least 20 to 30 minutes to start to work.  Don't wait till your pain becomes severe before you take your medicine. Try to time your medicine so that you can take it before starting an activity. This might be before you get dressed, go for a walk, or sit down for dinner.  Constipation is a common side effect of some pain medicines. Call your healthcare provider before taking any medicines such as laxatives or stool softeners to help ease constipation. Also ask if you should skip any foods. Drinking lots of fluids and eating foods such as fruits and vegetables that are high in fiber can also help. Remember, don't take laxatives unless your surgeon has prescribed them.  Drinking alcohol and taking pain medicine can cause dizziness and slow your breathing. It can even be deadly. Don't drink alcohol while taking pain medicine.  Pain medicine can make you react more slowly to things. Don't drive or run machinery while taking pain medicine.  Your healthcare provider may tell you to take acetaminophen to help ease your pain. Ask them how much you're supposed to take each day. Acetaminophen or other pain relievers may interact with your prescription medicines or other over-the-counter (OTC) medicines. Some prescription medicines have acetaminophen and other ingredients in them. Using both prescription and OTC acetaminophen for pain can cause you to accidentally overdose. Read the labels on your OTC medicines with care. This will help you to clearly know the list of ingredients, how much to take, and any warnings. It may also help you not take too much acetaminophen. If you have questions or don't understand the information, ask your pharmacist or healthcare provider to explain it to you before you take the OTC medicine.   Managing nausea  Some people have an upset stomach (nausea) after  surgery. This is often because of anesthesia, pain, or pain medicine, less movement of food in the stomach, or the stress of surgery. These tips will help you handle nausea and eat healthy foods as you get better. If you were on a special food plan before surgery, ask your healthcare provider if you should follow it while you get better. Check with your provider on how your eating should progress. It may depend on the surgery you had. These general tips may help:   Don't push yourself to eat. Your body will tell you when to eat and how much.  Start off with clear liquids and soup. They're easier to digest.  Next try semi-solid foods as you feel ready. These include mashed potatoes, applesauce, and gelatin.  Slowly move to solid foods. Don’t eat fatty, rich, or spicy foods at first.  Don't force yourself to have 3 large meals a day. Instead eat smaller amounts more often.  Take pain medicines with a small amount of solid food, such as crackers or toast. This helps prevent nausea.  When to call your healthcare provider  Call your healthcare provider right away if you have any of these:   You still have too much pain, or the pain gets worse, after taking the medicine. The medicine may not be strong enough. Or there may be a complication from the surgery.  You feel too sleepy, dizzy, or groggy. The medicine may be too strong.  Side effects such as nausea or vomiting. Your healthcare provider may advise taking other medicines to .  Skin changes such as rash, itching, or hives. This may mean you have an allergic reaction. Your provider may advise taking other medicines.  The incision looks different (for instance, part of it opens up).  Bleeding or fluid leaking from the incision site, and weren't told to expect that.  Fever of 100.4°F (38°C) or higher, or as directed by your provider.  Call 911  Call 911 right away if you have:   Trouble breathing  Facial swelling    If you have obstructive sleep apnea   You were given  anesthesia medicine during surgery to keep you comfortable and free of pain. After surgery, you may have more apnea spells because of this medicine and other medicines you were given. The spells may last longer than normal.    At home:  Keep using the continuous positive airway pressure (CPAP) device when you sleep. Unless your healthcare provider tells you not to, use it when you sleep, day or night. CPAP is a common device used to treat obstructive sleep apnea.  Talk with your provider before taking any pain medicine, muscle relaxants, or sedatives. Your provider will tell you about the possible dangers of taking these medicines.  Contact your provider if your sleeping changes a lot even when taking medicines as directed.  [employer] last reviewed this educational content on 10/1/2021  © 9728-4077 [employer]. All rights reserved. This information is not intended as a substitute for professional medical care. Always follow your healthcare professional's instructions.

## 2024-02-22 ENCOUNTER — ANESTHESIA EVENT (OUTPATIENT)
Dept: SURGERY | Facility: HOSPITAL | Age: 54
End: 2024-02-22
Payer: COMMERCIAL

## 2024-02-23 ENCOUNTER — HOSPITAL ENCOUNTER (OUTPATIENT)
Facility: HOSPITAL | Age: 54
Setting detail: HOSPITAL OUTPATIENT SURGERY
Discharge: HOME | End: 2024-02-23
Attending: OBSTETRICS & GYNECOLOGY | Admitting: OBSTETRICS & GYNECOLOGY
Payer: COMMERCIAL

## 2024-02-23 ENCOUNTER — ANESTHESIA (OUTPATIENT)
Dept: SURGERY | Facility: HOSPITAL | Age: 54
End: 2024-02-23
Payer: COMMERCIAL

## 2024-02-23 VITALS
SYSTOLIC BLOOD PRESSURE: 136 MMHG | DIASTOLIC BLOOD PRESSURE: 77 MMHG | WEIGHT: 132 LBS | TEMPERATURE: 98 F | HEIGHT: 61 IN | RESPIRATION RATE: 16 BRPM | BODY MASS INDEX: 24.92 KG/M2 | OXYGEN SATURATION: 100 % | HEART RATE: 56 BPM

## 2024-02-23 DIAGNOSIS — N95.0 POSTMENOPAUSAL BLEEDING: ICD-10-CM

## 2024-02-23 DIAGNOSIS — R93.89 THICKENED ENDOMETRIUM: ICD-10-CM

## 2024-02-23 PROBLEM — N89.8 VAGINAL DISCHARGE: Status: RESOLVED | Noted: 2021-12-09 | Resolved: 2024-02-23

## 2024-02-23 PROBLEM — Z01.419 WOMEN'S ANNUAL ROUTINE GYNECOLOGICAL EXAMINATION: Status: RESOLVED | Noted: 2021-12-09 | Resolved: 2024-02-23

## 2024-02-23 PROBLEM — N92.6 IRREGULAR MENSTRUAL CYCLE: Status: RESOLVED | Noted: 2020-02-21 | Resolved: 2024-02-23

## 2024-02-23 PROBLEM — N76.2 VULVITIS: Status: RESOLVED | Noted: 2019-06-11 | Resolved: 2024-02-23

## 2024-02-23 LAB — B-HCG UR QL: NEGATIVE

## 2024-02-23 PROCEDURE — 58558 HYSTEROSCOPY BIOPSY: CPT | Performed by: OBSTETRICS & GYNECOLOGY

## 2024-02-23 PROCEDURE — 0UDB8ZX EXTRACTION OF ENDOMETRIUM, VIA NATURAL OR ARTIFICIAL OPENING ENDOSCOPIC, DIAGNOSTIC: ICD-10-PCS | Performed by: OBSTETRICS & GYNECOLOGY

## 2024-02-23 RX ORDER — ONDANSETRON 2 MG/ML
INJECTION INTRAMUSCULAR; INTRAVENOUS AS NEEDED
Status: DISCONTINUED | OUTPATIENT
Start: 2024-02-23 | End: 2024-02-23 | Stop reason: SURG

## 2024-02-23 RX ORDER — NALOXONE HYDROCHLORIDE 0.4 MG/ML
0.08 INJECTION, SOLUTION INTRAMUSCULAR; INTRAVENOUS; SUBCUTANEOUS AS NEEDED
Status: DISCONTINUED | OUTPATIENT
Start: 2024-02-23 | End: 2024-02-23

## 2024-02-23 RX ORDER — LIDOCAINE HYDROCHLORIDE 10 MG/ML
INJECTION, SOLUTION EPIDURAL; INFILTRATION; INTRACAUDAL; PERINEURAL AS NEEDED
Status: DISCONTINUED | OUTPATIENT
Start: 2024-02-23 | End: 2024-02-23 | Stop reason: SURG

## 2024-02-23 RX ORDER — HYDROMORPHONE HYDROCHLORIDE 1 MG/ML
0.6 INJECTION, SOLUTION INTRAMUSCULAR; INTRAVENOUS; SUBCUTANEOUS EVERY 5 MIN PRN
Status: DISCONTINUED | OUTPATIENT
Start: 2024-02-23 | End: 2024-02-23

## 2024-02-23 RX ORDER — MORPHINE SULFATE 10 MG/ML
6 INJECTION, SOLUTION INTRAMUSCULAR; INTRAVENOUS EVERY 10 MIN PRN
Status: DISCONTINUED | OUTPATIENT
Start: 2024-02-23 | End: 2024-02-23

## 2024-02-23 RX ORDER — HYDROMORPHONE HYDROCHLORIDE 1 MG/ML
0.2 INJECTION, SOLUTION INTRAMUSCULAR; INTRAVENOUS; SUBCUTANEOUS EVERY 5 MIN PRN
Status: DISCONTINUED | OUTPATIENT
Start: 2024-02-23 | End: 2024-02-23

## 2024-02-23 RX ORDER — HYDROMORPHONE HYDROCHLORIDE 1 MG/ML
0.4 INJECTION, SOLUTION INTRAMUSCULAR; INTRAVENOUS; SUBCUTANEOUS EVERY 5 MIN PRN
Status: DISCONTINUED | OUTPATIENT
Start: 2024-02-23 | End: 2024-02-23

## 2024-02-23 RX ORDER — ONDANSETRON 4 MG/1
4 TABLET, FILM COATED ORAL EVERY 8 HOURS PRN
OUTPATIENT
Start: 2024-02-23

## 2024-02-23 RX ORDER — PHENYLEPHRINE HCL 10 MG/ML
VIAL (ML) INJECTION AS NEEDED
Status: DISCONTINUED | OUTPATIENT
Start: 2024-02-23 | End: 2024-02-23 | Stop reason: SURG

## 2024-02-23 RX ORDER — ONDANSETRON 2 MG/ML
4 INJECTION INTRAMUSCULAR; INTRAVENOUS EVERY 8 HOURS PRN
OUTPATIENT
Start: 2024-02-23

## 2024-02-23 RX ORDER — MIDAZOLAM HYDROCHLORIDE 1 MG/ML
INJECTION INTRAMUSCULAR; INTRAVENOUS AS NEEDED
Status: DISCONTINUED | OUTPATIENT
Start: 2024-02-23 | End: 2024-02-23 | Stop reason: SURG

## 2024-02-23 RX ORDER — SODIUM CHLORIDE, SODIUM LACTATE, POTASSIUM CHLORIDE, CALCIUM CHLORIDE 600; 310; 30; 20 MG/100ML; MG/100ML; MG/100ML; MG/100ML
INJECTION, SOLUTION INTRAVENOUS CONTINUOUS
Status: DISCONTINUED | OUTPATIENT
Start: 2024-02-23 | End: 2024-02-23

## 2024-02-23 RX ORDER — MORPHINE SULFATE 4 MG/ML
4 INJECTION, SOLUTION INTRAMUSCULAR; INTRAVENOUS EVERY 10 MIN PRN
Status: DISCONTINUED | OUTPATIENT
Start: 2024-02-23 | End: 2024-02-23

## 2024-02-23 RX ORDER — KETOROLAC TROMETHAMINE 30 MG/ML
INJECTION, SOLUTION INTRAMUSCULAR; INTRAVENOUS AS NEEDED
Status: DISCONTINUED | OUTPATIENT
Start: 2024-02-23 | End: 2024-02-23 | Stop reason: SURG

## 2024-02-23 RX ORDER — ACETAMINOPHEN 500 MG
1000 TABLET ORAL ONCE
Status: COMPLETED | OUTPATIENT
Start: 2024-02-23 | End: 2024-02-23

## 2024-02-23 RX ORDER — DEXAMETHASONE SODIUM PHOSPHATE 4 MG/ML
VIAL (ML) INJECTION AS NEEDED
Status: DISCONTINUED | OUTPATIENT
Start: 2024-02-23 | End: 2024-02-23 | Stop reason: SURG

## 2024-02-23 RX ORDER — GLYCOPYRROLATE 0.2 MG/ML
INJECTION, SOLUTION INTRAMUSCULAR; INTRAVENOUS AS NEEDED
Status: DISCONTINUED | OUTPATIENT
Start: 2024-02-23 | End: 2024-02-23 | Stop reason: SURG

## 2024-02-23 RX ORDER — MORPHINE SULFATE 4 MG/ML
2 INJECTION, SOLUTION INTRAMUSCULAR; INTRAVENOUS EVERY 10 MIN PRN
Status: DISCONTINUED | OUTPATIENT
Start: 2024-02-23 | End: 2024-02-23

## 2024-02-23 RX ADMIN — PHENYLEPHRINE HCL 100 MCG: 10 MG/ML VIAL (ML) INJECTION at 10:19:00

## 2024-02-23 RX ADMIN — DEXAMETHASONE SODIUM PHOSPHATE 4 MG: 4 MG/ML VIAL (ML) INJECTION at 10:07:00

## 2024-02-23 RX ADMIN — LIDOCAINE HYDROCHLORIDE 50 MG: 10 INJECTION, SOLUTION EPIDURAL; INFILTRATION; INTRACAUDAL; PERINEURAL at 10:07:00

## 2024-02-23 RX ADMIN — PHENYLEPHRINE HCL 100 MCG: 10 MG/ML VIAL (ML) INJECTION at 10:14:00

## 2024-02-23 RX ADMIN — KETOROLAC TROMETHAMINE 30 MG: 30 INJECTION, SOLUTION INTRAMUSCULAR; INTRAVENOUS at 10:28:00

## 2024-02-23 RX ADMIN — ONDANSETRON 4 MG: 2 INJECTION INTRAMUSCULAR; INTRAVENOUS at 10:07:00

## 2024-02-23 RX ADMIN — MIDAZOLAM HYDROCHLORIDE 2 MG: 1 INJECTION INTRAMUSCULAR; INTRAVENOUS at 10:07:00

## 2024-02-23 RX ADMIN — GLYCOPYRROLATE 0.2 MG: 0.2 INJECTION, SOLUTION INTRAMUSCULAR; INTRAVENOUS at 10:07:00

## 2024-02-23 RX ADMIN — SODIUM CHLORIDE, SODIUM LACTATE, POTASSIUM CHLORIDE, CALCIUM CHLORIDE: 600; 310; 30; 20 INJECTION, SOLUTION INTRAVENOUS at 10:03:00

## 2024-02-23 NOTE — ANESTHESIA PROCEDURE NOTES
Airway  Date/Time: 2/23/2024 10:08 AM  Urgency: elective      General Information and Staff    Patient location during procedure: OR  Anesthesiologist: Kenisha Russell MD  Performed: anesthesiologist   Performed by: Kenisha Russell MD  Authorized by: Kenisha Russell MD      Indications and Patient Condition  Indications for airway management: anesthesia  Spontaneous Ventilation: absent  Sedation level: deep  Preoxygenated: yes  Patient position: sniffing  Mask difficulty assessment: 0 - not attempted    Final Airway Details  Final airway type: supraglottic airway      Successful airway: classic  Size 3  Cuff Pressure (cm H2O): 8  Airway Seal Pressure (cm H2O): 12       Number of attempts at approach: 1  Number of other approaches attempted: 0    Additional Comments  Smooth atraumatic LMA placement with pre-lubricated LMA; lips and teeth in preinduction condition

## 2024-02-23 NOTE — ANESTHESIA PREPROCEDURE EVALUATION
Anesthesia PreOp Note    HPI:     Alana Sow is a 53 year old female who presents for preoperative consultation requested by: Regina Ball DO    Date of Surgery: 2/23/2024    Procedure(s):  Hysteroscopy dilation and curettage  Indication: Postmenopausal bleeding [N95.0]  Thickened endometrium [R93.89]    Relevant Problems   No relevant active problems       NPO:  Last Liquid Consumption Date: 02/22/24  Last Liquid Consumption Time: 1900  Last Solid Consumption Date: 02/22/24  Last Solid Consumption Time: 1900  Last Liquid Consumption Date: 02/22/24          History Review:  Patient Active Problem List    Diagnosis Date Noted    Endometrial thickening on ultrasound 10/04/2023    Left ovarian cyst 10/04/2023    Postmenopausal bleeding 09/26/2023    Women's annual routine gynecological examination 12/09/2021    Vaginal discharge 12/09/2021    Perimenopausal symptoms 10/08/2021    Subclinical hypothyroidism 08/27/2020    Irregular menstrual cycle 02/21/2020    Chronic pain of right knee 07/10/2019    Vulvitis 06/11/2019    ASCUS of cervix with negative high risk HPV 03/29/2019    Fibroadenoma of breast 08/30/2018    Breast mass 04/20/2018    Herpes 09/01/2017    Migraine headache 08/26/2016       Past Medical History:   Diagnosis Date    Abnormal Pap smear of cervix     ASCUS    H/O left breast biopsy     Human papilloma virus infection     Migraines     hx    Visual impairment     readers       Past Surgical History:   Procedure Laterality Date    BREAST BIOPSY Left     COLON SURGERY  11/2020    POLYPECTOMY     COLONOSCOPY      OTHER SURGICAL HISTORY Bilateral 2002/2007    BREAST LUMP REMOVED-BENIGN        Medications Prior to Admission   Medication Sig Dispense Refill Last Dose    Collagen-Vitamin C-Biotin (COLLAGEN 1500/C) 500-50-0.8 MG Oral Cap Take by mouth daily.   2/19/2024    VITAMIN B COMPLEX-C OR Take by mouth daily.   2/19/2024    omega-3 fatty acids 1000 MG Oral Cap Take 2,000 mg by mouth daily.    2/19/2024    VITAMIN D, ERGOCALCIFEROL, OR Take by mouth daily.   2/19/2024     Current Facility-Administered Medications Ordered in Epic   Medication Dose Route Frequency Provider Last Rate Last Admin    lactated ringers infusion   Intravenous Continuous Lizzy Regina SMITH DO 20 mL/hr at 02/23/24 0729 New Bag at 02/23/24 0729     No current [facility]-ordered outpatient medications on file.       No Known Allergies    Family History   Problem Relation Age of Onset    Lipids Father     Kidney Disease Father         ESRD    Hypertension Father     Lipids Mother     Hypertension Mother      Social History     Socioeconomic History    Marital status: Single   Occupational History    Occupation:     Tobacco Use    Smoking status: Never    Smokeless tobacco: Never   Substance and Sexual Activity    Alcohol use: Yes     Alcohol/week: 0.0 standard drinks of alcohol     Comment: socailly    Drug use: No    Sexual activity: Yes     Partners: Male       Available pre-op labs reviewed.  Lab Results   Component Value Date    URINEPREG Negative 02/23/2024             Vital Signs:  Body mass index is 24.94 kg/m².   height is 1.549 m (5' 1\") and weight is 59.9 kg (132 lb). Her oral temperature is 98.5 °F (36.9 °C). Her blood pressure is 129/65 and her pulse is 67. Her respiration is 18 and oxygen saturation is 96%.   Vitals:    02/19/24 1114 02/23/24 0722   BP:  129/65   Pulse:  67   Resp:  18   Temp:  98.5 °F (36.9 °C)   TempSrc:  Oral   SpO2:  96%   Weight: 60.3 kg (133 lb) 59.9 kg (132 lb)   Height: 1.549 m (5' 1\") 1.549 m (5' 1\")        Anesthesia Evaluation     Patient summary reviewed and Nursing notes reviewed    No history of anesthetic complications   Airway   Mallampati: II  TM distance: >3 FB  Neck ROM: full  Dental - Dentition appears grossly intact     Comment: Denies loose    Pulmonary - negative ROS and normal exam    breath sounds clear to auscultation  Cardiovascular - negative ROS and normal exam  Exercise  tolerance: good    NYHA Classification: I  ECG reviewed  Rhythm: regular  Rate: normal    Neuro/Psych - negative ROS     Comments: Migraine    GI/Hepatic/Renal - negative ROS     Endo/Other      Comments: Postmenopausal bleeding  Subclinical hypothyroidism  Sjogren's syndrome - stable w/o symptoms  Abdominal     Abdomen: soft.                 Anesthesia Plan:   ASA:  2  Plan:   General  Airway:  LMA  Post-op Pain Management: Subcutaneous, Oral pain medication and IV analgesics  Informed Consent Plan and Risks Discussed With:  Patient  Use of Blood Products Discussed With:  Patient  Blood Product Use Consented    Consent Comment: Plan: GA via LMA; ETT as backup  I spoke with Alana Sow regarding the risks/benefits/alternatives of sedation and general anesthesia at length.  Specifically I mentioned the risk of PONV, oropharyngeal injury, dental injury and allergic reactions to medications.  Alana Sow indicated that they understood and agreed to proceed despite the risks.      MD EDENILSON Gil          I have informed Alana Sow and/or legal guardian or family member of the nature of the anesthetic plan, benefits, risks including possible dental damage if relevant, major complications, and any alternative forms of anesthetic management.   All of the patient's questions were answered to the best of my ability. The patient desires the anesthetic management as planned.  Kenisha Russell MD  2/23/2024 8:43 AM  Present on Admission:  **None**

## 2024-02-23 NOTE — ANESTHESIA POSTPROCEDURE EVALUATION
Patient: Alana Sow    Procedure Summary       Date: 02/23/24 Room / Location: [facility] MAIN OR 08 / [facility] MAIN OR    Anesthesia Start: 1003 Anesthesia Stop:     Procedure: Hysteroscopy dilation and curettage Diagnosis:       Postmenopausal bleeding      Thickened endometrium      (Postmenopausal bleeding [N95.0]Thickened endometrium [R93.89])    Surgeons: Regina Ball DO Anesthesiologist: Kenisha Russell MD    Anesthesia Type: general ASA Status: 2            Anesthesia Type: general    Vitals Value Taken Time   BP 91/63 02/23/24 1042   Temp 97.2F 02/23/24 1042   Pulse 79 02/23/24 1042   Resp 14 02/23/24 1042   SpO2 94 % 02/23/24 1042   Vitals shown include unfiled device data.    [facility] AN Post Evaluation:   Patient Evaluated in PACU  Patient Participation: complete - patient participated  Level of Consciousness: awake and alert  Pain Score: 0  Pain Management: adequate  Airway Patency:patent  Dental exam unchanged from preop  Yes    Cardiovascular Status: acceptable  Respiratory Status: acceptable  Postoperative Hydration acceptable      Kenisha Russell MD  2/23/2024 10:42 AM

## 2024-02-23 NOTE — H&P
GYN Pre-op History and Physical  [facility]    CHIEF COMPLAINT: Scheduled surgery   HISTORY OF PRESENT ILLNESS:   Alana Sow is a 53 year old female   who presents for scheduled hysteroscopy D&C for postmenopausal bleeding.    No complaints today - hasn't had any more bleeding since last appointment.      PAST MEDICAL HISTORY:   Past Medical History:   Diagnosis Date    Abnormal Pap smear of cervix     ASCUS    H/O left breast biopsy     Human papilloma virus infection     Migraines     hx    Visual impairment     readers        PAST SURGICAL HISTORY:   Past Surgical History:   Procedure Laterality Date    Breast biopsy Left     Colon surgery  2020    POLYPECTOMY     Colonoscopy      Other surgical history Bilateral     BREAST LUMP REMOVED-BENIGN         PAST OB HISTORY:  OB History    Para Term  AB Living   0 0 0 0 0 0   SAB IAB Ectopic Multiple Live Births   0 0 0 0         CURRENT MEDICATIONS:    No current outpatient medications on file.    ALLERGIES:  No Known Allergies    SOCIAL HISTORY:  Social History     Socioeconomic History    Marital status: Single   Occupational History    Occupation:     Tobacco Use    Smoking status: Never    Smokeless tobacco: Never   Substance and Sexual Activity    Alcohol use: Yes     Alcohol/week: 0.0 standard drinks of alcohol     Comment: socailly    Drug use: No    Sexual activity: Yes     Partners: Male         FAMILY HISTORY:  Family History   Problem Relation Age of Onset    Lipids Father     Kidney Disease Father         ESRD    Hypertension Father     Lipids Mother     Hypertension Mother      ASSESSMENTS:  PHYSICAL EXAM:   Patient's last menstrual period was 2024 (exact date).   Vitals:    24 1114 24 0722   BP:  129/65   BP Location:  Right arm   Pulse:  67   Resp:  18   Temp:  98.5 °F (36.9 °C)   TempSrc:  Oral   SpO2:  96%   Weight: 133 lb (60.3 kg) 132 lb (59.9 kg)   Height: 61\" 61\"      CONSTITUTIONAL: Awake, alert, cooperative, no apparent distress, and appears stated age   NECK: Supple, symmetrical, trachea midline, no adenopathy, thyroid symmetric, not enlarged and no tenderness  LUNGS: No excess work of breathing, LCTAB  CV: RRR no murmurs  ABDOMEN: Soft, non-distended, non-tender, no masses palpated    GENITAL/URINARY:    Uterus nontender    MUSCULOSKELETAL: There is no redness, warmth, or swelling of the joints. Tone is normal.  NEUROLOGIC: Patient is awake, alert and oriented to name, place and time. Casual gait is normal.  SKIN: no bruising or bleeding and no rashes  PSYCHIATRIC: Behavior:  Appropriate  Mood:  appropriate    DATA:  US pelvis: 23  Uterus normal in size and echo-texture.     Endometrium 6.7 mm.     Both ovaries appear normal in size and echo pattern.      Pathology: [facility] 10/4/23  Endometrium; biopsy:   Scant fragments of weakly proliferative endometrium with glandular and stromal breakdown.  No definitive evidence of hyperplasia or carcinoma identified.  ASSESSMENT AND PLAN:  53 year old  here for scheduled hysteroscopy D&C  - risks of surgery reviewed including pain, bleeding, infection, uterine perforation (injury to surrounding structures). All questions answered, consent form signed and in chart  - proceed to OR when ready.     Plan for DC home once stable post-op    Regina Ball DO

## 2024-02-23 NOTE — OPERATIVE REPORT
OPERATIVE REPORT  [facility]    Pre-operative Diagnosis:  postmenopausal bleeding  Post-operative Diagnosis:  postmenopausal bleeding  Procedure:  hysteroscopy D&C (endometrial resection)  Surgeon:  Regina Ball DO   Anesthesia:  general  Total IV fluids:  1000 ml  Hysteroscopic Fluid Deficit: 100 ml  Urine Output:  30 ml  Estimated blood loss:  10 mL  Specimens:  endometrium  Complications: none  Condition:  stable to PACU  Findings:  Exam under anesthesia revealed, normal sized, anteverted uterus.  Cervix appeared closed.  The cervix was dilated up to the 6mm  Hegar dilator. Hysterscopy findings: Bilateral ostia appreciated, smooth thin endometrium throughout.  Excellent hemostasis achieved.   Technique: The patient was taken to the operating room after informed consent was obtained with IV running.  Once anesthesia was initiated and found to be adequate the patient was placed in the dorsal lithotomy [facility]  She underwent an exam under anesthesia with the findings noted above. The patient was prepped and draped in the normal sterile fashion for a vaginal procedure. Timeout was performed. The bladder was catheterized. A sterile speculum was placed in the patient's vagina with visualization of the entire cervix. The anterior lip of the cervix was grasped with a single-toothed tenaculum.  The cervix was then progressively dilated using up the #6mm Hegar dilator. The  truclear hysteroscope was then inserted through the cervix into the uterine cavity.  The uterine cavity was viewed with the findings noted above.  The truclear soft tissue device was used to perform endometrial sampling. The hysteroscope was then withdrawn. The endometrial sample was sent to pathology for further evaluation. All the instruments were removed from the patient's vagina including the tenaculum with excellent hemostasis noted at the tenaculum sites. The patient was awakened, extubated, and taken to the recovery room  in stable condition. She tolerated the procedure well.  Regina Ball, DO

## 2024-02-26 ENCOUNTER — TELEPHONE (OUTPATIENT)
Dept: OBGYN CLINIC | Facility: CLINIC | Age: 54
End: 2024-02-26

## 2024-02-26 NOTE — TELEPHONE ENCOUNTER
Called pt c/o cramping below sternum feels more like tension, comes/goes,  upper, mid abd.  Pt  had +BM, unable to use PS d/t uncomfortable not pain, used heating pad/tylenol.  Pt c/o fatigue but denies fever, n/v.    Informed pt of type of procedure, abd may have been inflated with air but will consult with provider and pt agrees.    RD not in the office and DIMITRI paged x2.      JN called informed, states pt to be seen in the ED due to chest pain.    LVM informed of JN believes pt is having chest pain to go to ED, encouraged to call office while on her way to the ED.    Pt called back informed of Dimitri's recommendation.  Also informed due to type of surgery (abd NOT inflated), concerned with cause of tension. Pt states again not in pain, informed of recommendation.  Per pt will discuss with .                      ASSESSMENT AND PLAN:  53 year old  here for scheduled hysteroscopy D&C  - risks of surgery reviewed including pain, bleeding, infection, uterine perforation (injury to surrounding structures). All questions answered, consent form signed and in chart  - proceed to OR when ready.      Plan for DC home once stable post-op     Regina Ball DO               Electronically signed by Regina Ball DO at 2024  9:54 AM

## 2024-02-26 NOTE — TELEPHONE ENCOUNTER
Patient states she feels cramping but not where surgical procedure was. Would like to know if this is part of the procedure or if it might be something else.

## 2024-03-11 ENCOUNTER — OFFICE VISIT (OUTPATIENT)
Dept: OBGYN CLINIC | Facility: CLINIC | Age: 54
End: 2024-03-11
Payer: COMMERCIAL

## 2024-03-11 VITALS
BODY MASS INDEX: 25.16 KG/M2 | SYSTOLIC BLOOD PRESSURE: 104 MMHG | DIASTOLIC BLOOD PRESSURE: 60 MMHG | HEIGHT: 61.5 IN | WEIGHT: 135 LBS

## 2024-03-11 DIAGNOSIS — Z09 POSTOPERATIVE EXAMINATION: Primary | ICD-10-CM

## 2024-03-11 NOTE — PROGRESS NOTES
RETURN GYN OFFICE VISIT  [facility]    CHIEF COMPLAINT:    Chief Complaint   Patient presents with    Er F/u     PT had surgery 2024.       HISTORY OF PRESENT ILLNESS:    SHANDA is a 53 year old female  here for post-op s/p hysteroscopy D&C on 24.      After the hysteroscopy - had some tightness in upper abdomen, centrally.  Saw chiropractor and got adjustment and felt better.    For the first 2-3 days was exhausted and stayed in bed much longer than normal.    Now is feeling good.    Had some bleeding, that stopped 3/1. And no spotting since then.      REVIEW OF SYSTEMS:   CONSTITUTIONAL:  negative for fevers, chills, sweats and fatigue  GASTROINTESTINAL:  negative for nausea, vomiting, blood in stool, constipation, diarrhea and abdominal pain  GENITOURINARY: negative for no dysuria, urgency or frequency; no urinary incontinence; no hematuria  SKIN:  negative for  rash, skin lesion and pruritus  ENDOCRINE:  negative for acne, fatigue, weight gain and weight loss  BEHAVIOR/PSYCH:  negative for depressed mood, anhedonia and anxiety    CURRENT MEDICATIONS:      Current Outpatient Medications:     Collagen-Vitamin C-Biotin (COLLAGEN 1500/C) 500-50-0.8 MG Oral Cap, Take by mouth daily., Disp: , Rfl:     VITAMIN B COMPLEX-C OR, Take by mouth daily., Disp: , Rfl:     omega-3 fatty acids 1000 MG Oral Cap, Take 2,000 mg by mouth daily., Disp: , Rfl:     VITAMIN D, ERGOCALCIFEROL, OR, Take by mouth daily., Disp: , Rfl:     PAST MEDICAL, SOCIAL AND FAMILY HISTORY:    Past Medical History:   Diagnosis Date    Abnormal Pap smear of cervix     ASCUS    H/O left breast biopsy     Human papilloma virus infection     Migraines     hx    Visual impairment     readers     Past Surgical History:   Procedure Laterality Date    BREAST BIOPSY Left     COLON SURGERY  2020    POLYPECTOMY     COLONOSCOPY      OTHER SURGICAL HISTORY Bilateral     BREAST LUMP REMOVED-BENIGN      Family History    Problem Relation Age of Onset    Lipids Father     Kidney Disease Father         ESRD    Hypertension Father     Lipids Mother     Hypertension Mother      Social History     Socioeconomic History    Marital status: Single   Occupational History    Occupation:     Tobacco Use    Smoking status: Never    Smokeless tobacco: Never   Substance and Sexual Activity    Alcohol use: Yes     Alcohol/week: 0.0 standard drinks of alcohol     Comment: socailly    Drug use: No    Sexual activity: Yes     Partners: Male           PHYSICAL EXAM:   Patient's last menstrual period was 01/08/2024.; Body mass index is 25.09 kg/m².      CONSTITUTIONAL:  Awake, alert, cooperative, no apparent distress  EYES: sclera clear and conjunctiva normal  GASTROINTESTINAL:  soft, non-distended, non-tender, no masses palpated  GENITAL/URINARY:    External Genitalia:  General appearance; normal, Hair distribution; normal, Lesions absent   Urethral Meatus:  Lesions absent, Prolapse absent  Bladder:  Tenderness absent, Cystocele absent  Vagina:  Discharge absent, Lesions absent, Pelvic support normal  Cervix:  Lesions absent, Discharge absent, Tenderness absent  Uterus:  Size normal, Masses absent, Tenderness absent  Adnexa:  Masses absent, Tenderness absent  Anus/Perineum:  Lesions absent  SKIN:  No rashes  PSYCH:  Affect Normal    Pathology:  Endometrium; biopsy:   Fragments of benign inactive endometrial glands and stroma.  No evidence of hyperplasia or malignancy is identified.      ASSESSMENT AND PLAN:  1. Postoperative examination  - reviewed normal pathology.  - ok to return to normal activities.  - f/u for yearly exam, or sooner if any more bleeding.        Regina Ball, DO

## 2025-04-08 ENCOUNTER — OFFICE VISIT (OUTPATIENT)
Dept: OBGYN CLINIC | Facility: CLINIC | Age: 55
End: 2025-04-08
Payer: COMMERCIAL

## 2025-04-08 VITALS
DIASTOLIC BLOOD PRESSURE: 68 MMHG | WEIGHT: 142 LBS | HEIGHT: 61.5 IN | SYSTOLIC BLOOD PRESSURE: 112 MMHG | BODY MASS INDEX: 26.47 KG/M2

## 2025-04-08 DIAGNOSIS — N89.8 VAGINAL DRYNESS: ICD-10-CM

## 2025-04-08 DIAGNOSIS — Z01.419 WOMEN'S ANNUAL ROUTINE GYNECOLOGICAL EXAMINATION: ICD-10-CM

## 2025-04-08 DIAGNOSIS — Z12.4 ROUTINE CERVICAL SMEAR: Primary | ICD-10-CM

## 2025-04-08 PROCEDURE — 99396 PREV VISIT EST AGE 40-64: CPT | Performed by: OBSTETRICS & GYNECOLOGY

## 2025-04-08 PROCEDURE — 87624 HPV HI-RISK TYP POOLED RSLT: CPT | Performed by: OBSTETRICS & GYNECOLOGY

## 2025-04-08 PROCEDURE — 99459 PELVIC EXAMINATION: CPT | Performed by: OBSTETRICS & GYNECOLOGY

## 2025-04-08 PROCEDURE — 88175 CYTOPATH C/V AUTO FLUID REDO: CPT | Performed by: OBSTETRICS & GYNECOLOGY

## 2025-04-08 PROCEDURE — 3008F BODY MASS INDEX DOCD: CPT | Performed by: OBSTETRICS & GYNECOLOGY

## 2025-04-08 PROCEDURE — 3078F DIAST BP <80 MM HG: CPT | Performed by: OBSTETRICS & GYNECOLOGY

## 2025-04-08 PROCEDURE — 3074F SYST BP LT 130 MM HG: CPT | Performed by: OBSTETRICS & GYNECOLOGY

## 2025-04-08 NOTE — PROGRESS NOTES
GYN ANNUAL    2025  11:17 AM    Chief Complaint   Patient presents with    Annual     Annual exam and pap (Reviewed Preventative/Wellness form with patient.)     HPI: Patient is a 54 year old  LMP 3/2022 presents for annual gyn exam and PAP. Has had no further episodes of PMB since 2023 when underwent [facility] D&C 2024 with Dr. Ball showing benign inactive EM. Reports only concern is vaginal dryness. Discussed trial first with non hormonal treatments including \"Revaree\" hyaluronic vaginal gel and/or  'VMagic\" vulvar balm of organic oils and botanicals. Counseled to call if no improvement if interested in transitioning to topical estradiol cream. No other gynecologic issues or concerns. Questions answered and agrees with plan.    OB History    Para Term  AB Living   0 0 0 0 0 0   SAB IAB Ectopic Multiple Live Births   0 0 0 0         GYN hx:    Hx Prior Abnormal Pap: No  Pap Date: 21  Pap Result Notes: NEGATIVE PAP  Follow Up Recommendation: LAST MAMMO 2025=benign  CONTRACEPTION: N/A  LAST MAMMOGRAM: 2025      Current Outpatient Medications   Medication Sig Dispense Refill    Collagen-Vitamin C-Biotin (COLLAGEN 1500/C) 500-50-0.8 MG Oral Cap Take by mouth daily.      VITAMIN B COMPLEX-C OR Take by mouth daily.      omega-3 fatty acids 1000 MG Oral Cap Take 2,000 mg by mouth daily.      VITAMIN D, ERGOCALCIFEROL, OR Take by mouth daily.         Past Medical History:    Abnormal Pap smear of cervix    ASCUS    Fibroids    Breast Fibroid-benign    H/O left breast biopsy    Heart murmur    Human papilloma virus infection    Menopause    Migraine headache without aura    Migraines    hx    Postmenopausal bleeding    [facility] 2024 = benign inactive EM    Visual impairment    readers     Past Surgical History:   Procedure Laterality Date    Breast biopsy Left     Breast surgery  ,     Removal of fibroid-benign    Colon surgery  2020    POLYPECTOMY     Colonoscopy      D &  c  Feb 2024    Bleeding after more than 1 year of missed periods    Hysteroscopy  02/2024    hysteroscopy D&C    Other surgical history Bilateral 2002/2007    BREAST LUMP REMOVED-BENIGN      Allergies[1]  Family History   Problem Relation Age of Onset    Lipids Father     Kidney Disease Father         ESRD    Hypertension Father     Lipids Mother     Hypertension Mother      Social History     Socioeconomic History    Marital status: Single   Occupational History    Occupation:     Tobacco Use    Smoking status: Never    Smokeless tobacco: Never   Substance and Sexual Activity    Alcohol use: Yes     Comment: 2-3 glasses a week    Drug use: No    Sexual activity: Yes     Partners: Male     Social History     Social History Narrative    Not on file       ROS:     Review of Systems:  A comprehensive 10 point ROS was completed. All pertinent positives and negatives noted in the HPI        /68   Ht 61.5\"   Wt 142 lb (64.4 kg)   LMP  (LMP Unknown)   BMI 26.40 kg/m²     Exam:   GENERAL: well developed, well nourished, in no apparent distress  SKIN: no rashes, no lesions  HEENT: normal  LUNGS: respiration unlabored  CARDIOVASCULAR: no peripheral edema or varicosities, skin warm and dry  BREASTS: bilaterally nontender, no palpable masses, no nipple discharge, no skin changes, no axillary adenopathy  ABDOMEN: Soft, non distended; non tender, no masses  GYNE/:   External Genitalia: normal, no lesions, good perineal support  Urethra: meatus normal  Bladder: well supported  Vagina: pale mucosa, no lesions, no discharge   Uterus: normal size, mobile, nontender  Cervix: normal os, no lesions or bleeding  Adnexa: bilaterally nontender, no palpable masses  Cul-de-sac: normal  R/V: normal perineum, no hemorrhoids  EXTREMITIES: nontender without edema      A/P: Patient is 54 year old female here for well-woman exam.     1. Women's annual routine gynecological exam  - PAP today    2. Vaginal dryness  - Start  \"Revaree\"' hyaluronic vaginal gel and or \"\"VMagic vulvar balm  - Call for Rx estradiol cream if no improvement      4/8/2025  Carmenza Hall MD                    [1] No Known Allergies

## 2025-04-09 LAB — HPV E6+E7 MRNA CVX QL NAA+PROBE: NEGATIVE

## (undated) DEVICE — SRG GLV PROTEXIS BLUE 7

## (undated) DEVICE — DRAPE SUR W53XL77IN STD POLYPR SMS 3 QTR SHT

## (undated) DEVICE — PACK CUSTOM D

## (undated) DEVICE — Device

## (undated) DEVICE — CANISTER SUCT 3000CC HI FLO DISP FOR FLD MGMT

## (undated) DEVICE — DRAPE BUTTOCKS

## (undated) DEVICE — MORCELLATOR HYSTEROSCOPIC MINI SFT TISS

## (undated) DEVICE — SEAL HYSTEROSCOPE TRUCLEAR

## (undated) DEVICE — SOLUTION IRRIG 3000ML 0.9% NACL FLX CONT

## (undated) DEVICE — KIT HYSTEROSCOPIC PROC INCL INFLO OUTFLO TB

## (undated) NOTE — MR AVS SNAPSHOT
After Visit Summary   12/9/2021    Fernanda Santiago   MRN: [id number]           Visit Information     Date & Time  12/9/2021  3:00 PM Provider  Pricilla Matthews, [address] [facility], [facility] Dept.  Phone  [phone] and can be paid hassle-free using a credit, debit, or health savings card. Not active on [employer]? Ask us how to get signed up today! If you receive a survey from [employer], please take a few minutes to complete it and provide feedback.  We strive

## (undated) NOTE — MR AVS SNAPSHOT
After Visit Summary   12/16/2020    Ramsey Garg    MRN: [id number]           Visit Information     Date & Time  12/16/2020  2:00 PM Provider  Claudette Monique, [address] [facility], [facility] Dept.  Phone  33 Instructions:  To schedule a test at any [facility], call [facility] Scheduling at [phone], Monday through Friday between 7:30am to 6pm and on Saturday between [facility] e-VISITS  Communicate with a [facility] Physician or [facility] online. The physician will respond and provide   a treatment plan within a few hours.  ONLINE VISIT  Primary Care Providers  Treatment for mild illness or injury that does not require immediate attention VID

## (undated) NOTE — MR AVS SNAPSHOT
After Visit Summary   12/30/2019    Forrest Dhaliwal    MRN: [id number]           Visit Information     Date & Time  12/30/2019 10:00 AM Provider  Soni Mendez, [address] [facility], [place] Dept.  Phone  33 will help us do so. For more information on [employer], please visit [email or web address]                   DO YOU KNOW WHERE TO GO? Injury & illness are never convenient.  If you are dealing with a   non-emergency, consider your o For more information about hours, locations or appointment options available at [facility],   visit [email or web address] or call [phone]. ([phone])